# Patient Record
Sex: FEMALE | Race: ASIAN | URBAN - METROPOLITAN AREA
[De-identification: names, ages, dates, MRNs, and addresses within clinical notes are randomized per-mention and may not be internally consistent; named-entity substitution may affect disease eponyms.]

---

## 2022-11-15 ENCOUNTER — EMERGENCY (EMERGENCY)
Facility: HOSPITAL | Age: 22
LOS: 0 days | Discharge: ANOTHER HOSPITAL | End: 2022-11-16
Attending: EMERGENCY MEDICINE

## 2022-11-15 VITALS
HEART RATE: 107 BPM | SYSTOLIC BLOOD PRESSURE: 101 MMHG | RESPIRATION RATE: 20 BRPM | OXYGEN SATURATION: 100 % | TEMPERATURE: 98 F | DIASTOLIC BLOOD PRESSURE: 63 MMHG

## 2022-11-15 VITALS
OXYGEN SATURATION: 98 % | SYSTOLIC BLOOD PRESSURE: 95 MMHG | TEMPERATURE: 99 F | HEIGHT: 61.42 IN | HEART RATE: 135 BPM | DIASTOLIC BLOOD PRESSURE: 54 MMHG | RESPIRATION RATE: 25 BRPM | WEIGHT: 154.98 LBS

## 2022-11-15 DIAGNOSIS — Z3A.01 LESS THAN 8 WEEKS GESTATION OF PREGNANCY: ICD-10-CM

## 2022-11-15 DIAGNOSIS — Z20.822 CONTACT WITH AND (SUSPECTED) EXPOSURE TO COVID-19: ICD-10-CM

## 2022-11-15 DIAGNOSIS — R11.10 VOMITING, UNSPECIFIED: ICD-10-CM

## 2022-11-15 DIAGNOSIS — O99.891 OTHER SPECIFIED DISEASES AND CONDITIONS COMPLICATING PREGNANCY: ICD-10-CM

## 2022-11-15 DIAGNOSIS — R10.11 RIGHT UPPER QUADRANT PAIN: ICD-10-CM

## 2022-11-15 DIAGNOSIS — O23.41 UNSPECIFIED INFECTION OF URINARY TRACT IN PREGNANCY, FIRST TRIMESTER: ICD-10-CM

## 2022-11-15 DIAGNOSIS — N13.2 HYDRONEPHROSIS WITH RENAL AND URETERAL CALCULOUS OBSTRUCTION: ICD-10-CM

## 2022-11-15 LAB
ALBUMIN SERPL ELPH-MCNC: 2.8 G/DL — LOW (ref 3.3–5)
ALP SERPL-CCNC: 108 U/L — SIGNIFICANT CHANGE UP (ref 40–120)
ALT FLD-CCNC: 17 U/L — SIGNIFICANT CHANGE UP (ref 12–78)
ANION GAP SERPL CALC-SCNC: 10 MMOL/L — SIGNIFICANT CHANGE UP (ref 5–17)
APPEARANCE UR: ABNORMAL
APTT BLD: 26.9 SEC — LOW (ref 27.5–35.5)
AST SERPL-CCNC: 13 U/L — LOW (ref 15–37)
BACTERIA # UR AUTO: ABNORMAL
BASOPHILS # BLD AUTO: 0.04 K/UL — SIGNIFICANT CHANGE UP (ref 0–0.2)
BASOPHILS NFR BLD AUTO: 0.3 % — SIGNIFICANT CHANGE UP (ref 0–2)
BILIRUB SERPL-MCNC: 0.9 MG/DL — SIGNIFICANT CHANGE UP (ref 0.2–1.2)
BILIRUB UR-MCNC: NEGATIVE — SIGNIFICANT CHANGE UP
BUN SERPL-MCNC: 7 MG/DL — SIGNIFICANT CHANGE UP (ref 7–23)
CALCIUM SERPL-MCNC: 8.6 MG/DL — SIGNIFICANT CHANGE UP (ref 8.5–10.1)
CHLORIDE SERPL-SCNC: 104 MMOL/L — SIGNIFICANT CHANGE UP (ref 96–108)
CO2 SERPL-SCNC: 21 MMOL/L — LOW (ref 22–31)
COLOR SPEC: YELLOW — SIGNIFICANT CHANGE UP
CREAT SERPL-MCNC: 0.68 MG/DL — SIGNIFICANT CHANGE UP (ref 0.5–1.3)
DIFF PNL FLD: ABNORMAL
EGFR: 126 ML/MIN/1.73M2 — SIGNIFICANT CHANGE UP
EOSINOPHIL # BLD AUTO: 0.01 K/UL — SIGNIFICANT CHANGE UP (ref 0–0.5)
EOSINOPHIL NFR BLD AUTO: 0.1 % — SIGNIFICANT CHANGE UP (ref 0–6)
EPI CELLS # UR: ABNORMAL
FLUAV AG NPH QL: SIGNIFICANT CHANGE UP
FLUBV AG NPH QL: DETECTED
GLUCOSE SERPL-MCNC: 71 MG/DL — SIGNIFICANT CHANGE UP (ref 70–99)
GLUCOSE UR QL: NEGATIVE MG/DL — SIGNIFICANT CHANGE UP
HCG UR QL: POSITIVE
HCT VFR BLD CALC: 32.9 % — LOW (ref 34.5–45)
HGB BLD-MCNC: 11.1 G/DL — LOW (ref 11.5–15.5)
IMM GRANULOCYTES NFR BLD AUTO: 0.7 % — SIGNIFICANT CHANGE UP (ref 0–0.9)
INR BLD: 1.1 RATIO — SIGNIFICANT CHANGE UP (ref 0.88–1.16)
KETONES UR-MCNC: ABNORMAL
LACTATE SERPL-SCNC: 0.9 MMOL/L — SIGNIFICANT CHANGE UP (ref 0.7–2)
LEUKOCYTE ESTERASE UR-ACNC: ABNORMAL
LYMPHOCYTES # BLD AUTO: 1.06 K/UL — SIGNIFICANT CHANGE UP (ref 1–3.3)
LYMPHOCYTES # BLD AUTO: 7.7 % — LOW (ref 13–44)
MCHC RBC-ENTMCNC: 29.1 PG — SIGNIFICANT CHANGE UP (ref 27–34)
MCHC RBC-ENTMCNC: 33.7 G/DL — SIGNIFICANT CHANGE UP (ref 32–36)
MCV RBC AUTO: 86.1 FL — SIGNIFICANT CHANGE UP (ref 80–100)
MONOCYTES # BLD AUTO: 1.07 K/UL — HIGH (ref 0–0.9)
MONOCYTES NFR BLD AUTO: 7.8 % — SIGNIFICANT CHANGE UP (ref 2–14)
NEUTROPHILS # BLD AUTO: 11.47 K/UL — HIGH (ref 1.8–7.4)
NEUTROPHILS NFR BLD AUTO: 83.4 % — HIGH (ref 43–77)
NITRITE UR-MCNC: POSITIVE
NRBC # BLD: 0 /100 WBCS — SIGNIFICANT CHANGE UP (ref 0–0)
PH UR: 7 — SIGNIFICANT CHANGE UP (ref 5–8)
PLATELET # BLD AUTO: 263 K/UL — SIGNIFICANT CHANGE UP (ref 150–400)
POTASSIUM SERPL-MCNC: 3.4 MMOL/L — LOW (ref 3.5–5.3)
POTASSIUM SERPL-SCNC: 3.4 MMOL/L — LOW (ref 3.5–5.3)
PROT SERPL-MCNC: 7.2 GM/DL — SIGNIFICANT CHANGE UP (ref 6–8.3)
PROT UR-MCNC: 30 MG/DL
PROTHROM AB SERPL-ACNC: 13.2 SEC — SIGNIFICANT CHANGE UP (ref 10.5–13.4)
RBC # BLD: 3.82 M/UL — SIGNIFICANT CHANGE UP (ref 3.8–5.2)
RBC # FLD: 13.6 % — SIGNIFICANT CHANGE UP (ref 10.3–14.5)
RBC CASTS # UR COMP ASSIST: SIGNIFICANT CHANGE UP /HPF (ref 0–4)
SARS-COV-2 RNA SPEC QL NAA+PROBE: SIGNIFICANT CHANGE UP
SODIUM SERPL-SCNC: 135 MMOL/L — SIGNIFICANT CHANGE UP (ref 135–145)
SP GR SPEC: 1.01 — SIGNIFICANT CHANGE UP (ref 1.01–1.02)
UROBILINOGEN FLD QL: 4 MG/DL
WBC # BLD: 13.75 K/UL — HIGH (ref 3.8–10.5)
WBC # FLD AUTO: 13.75 K/UL — HIGH (ref 3.8–10.5)
WBC UR QL: ABNORMAL

## 2022-11-15 PROCEDURE — 93010 ELECTROCARDIOGRAM REPORT: CPT

## 2022-11-15 PROCEDURE — 99285 EMERGENCY DEPT VISIT HI MDM: CPT

## 2022-11-15 PROCEDURE — 76775 US EXAM ABDO BACK WALL LIM: CPT | Mod: 26

## 2022-11-15 RX ORDER — ACETAMINOPHEN 500 MG
1000 TABLET ORAL ONCE
Refills: 0 | Status: COMPLETED | OUTPATIENT
Start: 2022-11-15 | End: 2022-11-15

## 2022-11-15 RX ORDER — CEFTRIAXONE 500 MG/1
1000 INJECTION, POWDER, FOR SOLUTION INTRAMUSCULAR; INTRAVENOUS ONCE
Refills: 0 | Status: COMPLETED | OUTPATIENT
Start: 2022-11-15 | End: 2022-11-15

## 2022-11-15 RX ORDER — SODIUM CHLORIDE 9 MG/ML
2200 INJECTION INTRAMUSCULAR; INTRAVENOUS; SUBCUTANEOUS ONCE
Refills: 0 | Status: COMPLETED | OUTPATIENT
Start: 2022-11-15 | End: 2022-11-15

## 2022-11-15 RX ORDER — POTASSIUM CHLORIDE 20 MEQ
40 PACKET (EA) ORAL ONCE
Refills: 0 | Status: COMPLETED | OUTPATIENT
Start: 2022-11-15 | End: 2022-11-15

## 2022-11-15 RX ADMIN — Medication 400 MILLIGRAM(S): at 19:06

## 2022-11-15 RX ADMIN — Medication 40 MILLIEQUIVALENT(S): at 23:15

## 2022-11-15 RX ADMIN — SODIUM CHLORIDE 2200 MILLILITER(S): 9 INJECTION INTRAMUSCULAR; INTRAVENOUS; SUBCUTANEOUS at 18:20

## 2022-11-15 RX ADMIN — CEFTRIAXONE 100 MILLIGRAM(S): 500 INJECTION, POWDER, FOR SOLUTION INTRAMUSCULAR; INTRAVENOUS at 19:06

## 2022-11-15 NOTE — ED ADULT NURSE NOTE - ED STAT RN HANDOFF DETAILS
Dick Mirza(Attending) Report received from CANDI Kirby at 7pm. Assessment available on KB. Pt A&Ox3, resting in bed, appears in NAD, on cardiac monitor, urine collected, fluids infusing. will continue to monitor.

## 2022-11-15 NOTE — ED ADULT TRIAGE NOTE - CHIEF COMPLAINT QUOTE
Patient c/o pain to right flank, fever, headache and vomiting since yesterday. Patient is currently 5 months pregnant.   no pmh

## 2022-11-15 NOTE — ED PROVIDER NOTE - OBJECTIVE STATEMENT
21 y/o female with no pertinent PMHx of c/o R flank pain. Pt describes pain as sharp, 8/10, and non-radiating which began yesterday. Denies, falls, trauma, fever, dysuria, hematuria, or vaginal bleeding. Pt didn't not take any pain medications. Pt endorses one episode of vomiting, but is not currently nauseas. No Hx of kidney stones.

## 2022-11-15 NOTE — ED ADULT TRIAGE NOTE - GLASGOW COMA SCALE: SCORE, MLM
Ongoing SW/CM Assessment/Plan of Care Note     See SW/CM flowsheets for goals and other objective data.    Patient/Family discharge goal (s):  Goal #1: Communication facilitated  Goal #2: Extended Care Facility discharge arranged  Goal #3: Transportation arranged or issues addressed    PT Recommendation:       OT Recommendation:       SLP Recommendation:       Disposition:       Progress note:   Mila at [facility], Floor RN, HUC and pt updated of insurance approval.  Await [employer] authorization # to make transfer arrangements.  Sw following.         15

## 2022-11-15 NOTE — ED PROVIDER NOTE - CLINICAL SUMMARY MEDICAL DECISION MAKING FREE TEXT BOX
Pt  w/ right flank pain  Tachycardic, code sepsis alert , likely source is UTI pylo.   Will do ultrasound to r/o hydronephrosis, possible renal stone  Antibiotics ordered, IV Fluids given, will reevaluate

## 2022-11-15 NOTE — ED PROVIDER NOTE - SHIFT CHANGE DETAILS
F/U US results if hydronephrosis renal stone needs to be rule out patient will need MRI as she is pregnant if negative Re-eval and discharge with antibiotics for pyelo if patient stable and able to tolerate PO

## 2022-11-15 NOTE — ED ADULT NURSE NOTE - OBJECTIVE STATEMENT
Patient received with complaints of pain to R flank that started accompanied with burning sensation with urination. Pt denies nausea but vomited x1 earlier today. Pt is 5 months pregnant. Sepsis protocol initiated, large bore IV started, blood drawn.

## 2022-11-15 NOTE — ED PROVIDER NOTE - GENITOURINARY NEGATIVE STATEMENT, MLM
Additional Notes: Lesions are asymptomatic and benign appearing.  Pt wants to discuss cosmetic removal but decided against it today.
Render Risk Assessment In Note?: no
Detail Level: Simple
no dysuria, no frequency, and no hematuria.

## 2022-11-15 NOTE — ED PROVIDER NOTE - CARE PLAN
1 Principal Discharge DX:	Right renal stone  Secondary Diagnosis:	Acute UTI  Secondary Diagnosis:	Pregnancy

## 2022-11-15 NOTE — ED ADULT NURSE NOTE - ED STAT RN HANDOFF DETAILS 2
Report given to receiving ob RN Holly, Pt A&Ox3, resting in bed, appears in NAD, pts history, current condition and reason for admission discussed, safety concerns addressed and reviewed, pt currently in stable condition, IV flushes for patency and site shows no signs or symptoms of infiltrate, dressing is clean dry and intact, pt is aware of plan of care. Pt education deemed successful at time of report after patient demonstrates successful teach back for proficiency. Pending EMS .

## 2022-11-15 NOTE — ED PROVIDER NOTE - PROGRESS NOTE DETAILS
RENAN MERA: , 5 months gestation, (Due Date 3/27/2023) presenting with right flank pain x 1 days. UTI (+) on UA, WBC (+), Fever, RIGHT sided hydronephrosis on US, influenza B (+), febrile, tachycardic. Concerning for RIGHT sided hydronephrosis concerning for septic stone. LMP 22. Tx Ceftriaxone. PMD Jaz Javier. Spoke w/ OB. TRSF [facility]. To be transferred for fetal monitoring. Dr. Sharpe

## 2022-11-16 ENCOUNTER — APPOINTMENT (OUTPATIENT)
Dept: ANTEPARTUM | Facility: HOSPITAL | Age: 22
End: 2022-11-16

## 2022-11-16 ENCOUNTER — ASOB RESULT (OUTPATIENT)
Age: 22
End: 2022-11-16

## 2022-11-16 ENCOUNTER — INPATIENT (INPATIENT)
Facility: HOSPITAL | Age: 22
LOS: 1 days | Discharge: HOME | End: 2022-11-18
Attending: SPECIALIST | Admitting: SPECIALIST

## 2022-11-16 VITALS
TEMPERATURE: 98 F | DIASTOLIC BLOOD PRESSURE: 58 MMHG | HEART RATE: 106 BPM | SYSTOLIC BLOOD PRESSURE: 109 MMHG | RESPIRATION RATE: 18 BRPM

## 2022-11-16 DIAGNOSIS — N12 TUBULO-INTERSTITIAL NEPHRITIS, NOT SPECIFIED AS ACUTE OR CHRONIC: ICD-10-CM

## 2022-11-16 DIAGNOSIS — R50.9 FEVER, UNSPECIFIED: ICD-10-CM

## 2022-11-16 DIAGNOSIS — Z87.730 PERSONAL HISTORY OF (CORRECTED) CLEFT LIP AND PALATE: Chronic | ICD-10-CM

## 2022-11-16 LAB
BLD GP AB SCN SERPL QL: NEGATIVE — SIGNIFICANT CHANGE UP
HCT VFR BLD CALC: 26.8 % — LOW (ref 34.5–45)
HGB BLD-MCNC: 9 G/DL — LOW (ref 11.5–15.5)
MCHC RBC-ENTMCNC: 29.2 PG — SIGNIFICANT CHANGE UP (ref 27–34)
MCHC RBC-ENTMCNC: 33.6 GM/DL — SIGNIFICANT CHANGE UP (ref 32–36)
MCV RBC AUTO: 87 FL — SIGNIFICANT CHANGE UP (ref 80–100)
NRBC # BLD: 0 /100 WBCS — SIGNIFICANT CHANGE UP (ref 0–0)
NRBC # FLD: 0 K/UL — SIGNIFICANT CHANGE UP (ref 0–0)
PLATELET # BLD AUTO: 226 K/UL — SIGNIFICANT CHANGE UP (ref 150–400)
RBC # BLD: 3.08 M/UL — LOW (ref 3.8–5.2)
RBC # FLD: 13.5 % — SIGNIFICANT CHANGE UP (ref 10.3–14.5)
RH IG SCN BLD-IMP: POSITIVE — SIGNIFICANT CHANGE UP
RH IG SCN BLD-IMP: POSITIVE — SIGNIFICANT CHANGE UP
WBC # BLD: 8.56 K/UL — SIGNIFICANT CHANGE UP (ref 3.8–10.5)
WBC # FLD AUTO: 8.56 K/UL — SIGNIFICANT CHANGE UP (ref 3.8–10.5)

## 2022-11-16 PROCEDURE — 99221 1ST HOSP IP/OBS SF/LOW 40: CPT

## 2022-11-16 PROCEDURE — 76811 OB US DETAILED SNGL FETUS: CPT | Mod: 26

## 2022-11-16 RX ORDER — CEFTRIAXONE 500 MG/1
1000 INJECTION, POWDER, FOR SOLUTION INTRAMUSCULAR; INTRAVENOUS EVERY 24 HOURS
Refills: 0 | Status: DISCONTINUED | OUTPATIENT
Start: 2022-11-16 | End: 2022-11-18

## 2022-11-16 RX ORDER — HEPARIN SODIUM 5000 [USP'U]/ML
5000 INJECTION INTRAVENOUS; SUBCUTANEOUS EVERY 12 HOURS
Refills: 0 | Status: DISCONTINUED | OUTPATIENT
Start: 2022-11-16 | End: 2022-11-16

## 2022-11-16 RX ORDER — CEFTRIAXONE 500 MG/1
1000 INJECTION, POWDER, FOR SOLUTION INTRAMUSCULAR; INTRAVENOUS EVERY 24 HOURS
Refills: 0 | Status: DISCONTINUED | OUTPATIENT
Start: 2022-11-16 | End: 2022-11-16

## 2022-11-16 RX ORDER — HEPARIN SODIUM 5000 [USP'U]/ML
5000 INJECTION INTRAVENOUS; SUBCUTANEOUS EVERY 12 HOURS
Refills: 0 | Status: DISCONTINUED | OUTPATIENT
Start: 2022-11-16 | End: 2022-11-18

## 2022-11-16 RX ORDER — SODIUM CHLORIDE 9 MG/ML
1000 INJECTION INTRAMUSCULAR; INTRAVENOUS; SUBCUTANEOUS ONCE
Refills: 0 | Status: COMPLETED | OUTPATIENT
Start: 2022-11-16 | End: 2022-11-16

## 2022-11-16 RX ORDER — SIMETHICONE 80 MG/1
80 TABLET, CHEWABLE ORAL
Refills: 0 | Status: DISCONTINUED | OUTPATIENT
Start: 2022-11-16 | End: 2022-11-18

## 2022-11-16 RX ORDER — ACETAMINOPHEN 500 MG
975 TABLET ORAL ONCE
Refills: 0 | Status: COMPLETED | OUTPATIENT
Start: 2022-11-16 | End: 2022-11-16

## 2022-11-16 RX ORDER — SIMETHICONE 80 MG/1
80 TABLET, CHEWABLE ORAL
Refills: 0 | Status: DISCONTINUED | OUTPATIENT
Start: 2022-11-16 | End: 2022-11-16

## 2022-11-16 RX ADMIN — Medication 975 MILLIGRAM(S): at 20:07

## 2022-11-16 RX ADMIN — CEFTRIAXONE 100 MILLIGRAM(S): 500 INJECTION, POWDER, FOR SOLUTION INTRAMUSCULAR; INTRAVENOUS at 18:43

## 2022-11-16 RX ADMIN — HEPARIN SODIUM 5000 UNIT(S): 5000 INJECTION INTRAVENOUS; SUBCUTANEOUS at 18:42

## 2022-11-16 RX ADMIN — SODIUM CHLORIDE 1000 MILLILITER(S): 9 INJECTION INTRAMUSCULAR; INTRAVENOUS; SUBCUTANEOUS at 01:16

## 2022-11-16 RX ADMIN — Medication 1 TABLET(S): at 08:28

## 2022-11-16 NOTE — OB PROVIDER H&P - HISTORY OF PRESENT ILLNESS
22y  at 21w0d transferred from [facility]. Pt p/w c/o sharp right sided flank pain, non radiating since the . Denies any nausea, had one episode of vomiting. Denies any urinary symptoms.  Patient was evaluated and found to have influenza B, mild hydronephrosis of right kidney and UA with +nitrite, mod LE, mod blood and mod ketones. Pt received IV hydration, 1g of ceftriaxone.  Reports +FM, no vaginal bleeding, no ROM or LOF  Prenatal care: OB affiliated with [facility], denies any complication with the pregnancy so far.    GYN: Denies   OBH:   PAST MEDICAL & SURGICAL HISTORY: Denies  No Known Allergies

## 2022-11-16 NOTE — OB RN TRIAGE NOTE - CHIEF COMPLAINT QUOTE
Transfer from [place] for Right flank pain , nausea, vomiting, [id number], IV Tylenol at 7 pm and temp 98.3 after the tylenols,  Ceftriaxone 1gm at 7 pm , tachycardia , UTI, hydronephrosis and  influenza B positive

## 2022-11-16 NOTE — OB PROVIDER H&P - NSHPPHYSICALEXAM_GEN_ALL_CORE
T(C): 36.8 (11-15-22 @ 23:16), Max: 38.4 (11-15-22 @ 17:45)  HR: 106 (11-16-22 @ 03:49) (106 - 135)  BP: 109/58 (11-16-22 @ 03:49) (95/54 - 109/58)  RR: 20 (11-15-22 @ 23:16) (20 - 32)  SpO2: 100% (11-15-22 @ 23:16) (98% - 100%)    Heart: RRR  Lungs: CTA  Abdomen: Gravid, soft, NT, +Right sided CVA tenderness    [place]: no contractions  TAS: SLIUP, transverse presentation, posterior placenta,           NARDA: MVP: 4.82, +FM, +FH @ 145bpm    Sono done @ [facility]  Rt kidney: Mild hydronephrosis, no renal mass or calculi  Lt kidney: No hydronephrosis, renal mass or calculi  - SLIUP, breech presentation, FHR @139bpm.  -Closed cervix with cervical length of 4.3cm

## 2022-11-16 NOTE — PROGRESS NOTE ADULT - SUBJECTIVE AND OBJECTIVE BOX
R3 Antepartum Note, HD#1    Patient seen and examined at bedside, no acute overnight events. No acute complaints. Patient states that her right back pain has improved to a 3/10 in severity now. Declines pain medication at this time. Pt reports +FM, denies LOF, VB, ctx, HA, epigastric pain, blurred vision, CP, SOB, N/V, fevers, and chills.    Vital Signs Last 24 Hours  T(C): 36.8 (11-16-22 @ 03:47), Max: 38.4 (11-15-22 @ 17:45)  HR: 106 (11-16-22 @ 03:49) (106 - 135)  BP: 109/58 (11-16-22 @ 03:49) (95/54 - 109/58)  RR: 18 (11-16-22 @ 03:35) (18 - 32)  SpO2: 100% (11-15-22 @ 23:16) (98% - 100%)    CAPILLARY BLOOD GLUCOSE      Physical Exam:  General: NAD  Abdomen: Soft, non-tender, gravid  +Right CVA tenderness  Ext: No pain or swelling    FH present    Labs:             11.1   13.75 )-----------( 263      ( 11-15 @ 18:20 )             32.9     11-15 @ 18:20    135  |  104  |  7   ----------------------------<  71  3.4   |  21  |  0.68    Ca    8.6      11-15 @ 18:20    TPro  7.2  /  Alb  2.8  /  TBili  0.9  /  DBili  x   /  AST  13  /  ALT  17  /  AlkPhos  108  11-15 @ 18:20    PT/INR - ( 11-15 @ 18:20 )   PT: 13.2 sec;   INR: 1.10 ratio    PTT - ( 11-15 @ 18:20 )  PTT:26.9 sec    MEDICATIONS  (STANDING):  cefTRIAXone   IVPB 1000 milliGRAM(s) IV Intermittent every 24 hours       Impulsivity/History of being victimized/traumatized/Community stressors that increase the risk of destabilization/Irritability

## 2022-11-16 NOTE — OB RN TRIAGE NOTE - FALL HARM RISK - UNIVERSAL INTERVENTIONS
Bed in lowest position, wheels locked, appropriate side rails in place/Call bell, personal items and telephone in reach/Instruct patient to call for assistance before getting out of bed or chair/Non-slip footwear when patient is out of bed/[place] to call system/Physically safe environment - no spills, clutter or unnecessary equipment/Purposeful Proactive Rounding/Room/bathroom lighting operational, light cord in reach

## 2022-11-16 NOTE — PROGRESS NOTE ADULT - ASSESSMENT
23yo  at 21w transferred from [place] for right flank pain and treatment of pyelonephritis Patient noted to be + for Influenza B as well. Patient febrile and tachycardic on arrival to [place], PE with R CVA tenderness, UA with Mod ketones, Pos nitrite, mod leuk esterase, many bacteria and Renal US with mild right hydronephrosis. Patient received Ceftriaxone and is stable at this time with improvement in her pain.     #Pyelonephritis  - UA with Mod ketones, Pos nitrite, mod leuk esterase, many bacteria   - Renal US with mild right hydronephrosis  - Ceftriaxone (11/15-)  - pain control prn   - f/u UCX (11/15)    #Fetal wellbeing  - FH present on arrival     #Maternal wellbeing  - Regular diet  - HSQ/SCDs for DVT ppx  - PNV/Iron/Colace/Folic acid  - f/u UCx    Anisa Nunes PGY3

## 2022-11-16 NOTE — OB PROVIDER H&P - ASSESSMENT
22y  at 21w0d admitted for inpatient treatment of pyelonephritis.  Afebrile at present  - +Influenza B  - +UTI  - Mild hydronephrosis of Rt kidney  - Awaiting Urine culture and blood culture results  D/w Dr Nunes and Dr Pedraza  -Admit to Antepartum service  -Pain Management prn  -FH check BID  -IV saline lock  -Regular diet  -Ceftriaxone 1g q 24hrs

## 2022-11-16 NOTE — OB PROVIDER H&P - NSHPLABSRESULTS_GEN_ALL_CORE
LABS:                        11.1   13.75 )-----------( 263      ( 15 Nov 2022 18:20 )             32.9     -15    135  |  104  |  7   ----------------------------<  71  3.4<L>   |  21<L>  |  0.68    Ca    8.6      15 Nov 2022 18:20    TPro  7.2  /  Alb  2.8<L>  /  TBili  0.9  /  DBili  x   /  AST  13<L>  /  ALT  17  /  AlkPhos  108  11-15    I&O's Detail    PT/INR - ( 15 Nov 2022 18:20 )   PT: 13.2 sec;   INR: 1.10 ratio         PTT - ( 15 Nov 2022 18:20 )  PTT:26.9 sec  Urinalysis Basic - ( 15 Nov 2022 19:00 )    Color: Yellow / Appearance: very cloudy / S.010 / pH: x  Gluc: x / Ketone: Moderate  / Bili: Negative / Urobili: 4 mg/dL   Blood: x / Protein: 30 mg/dL / Nitrite: Positive   Leuk Esterase: Moderate / RBC: 0-2 /HPF / WBC 11-25   Sq Epi: x / Non Sq Epi: Moderate / Bacteria: Many

## 2022-11-16 NOTE — OB PROVIDER H&P - PROBLEM SELECTOR PLAN 1
-Admit to Antepartum service  -Pain Management prn  -FH check BID  -IV saline lock  -Regular diet  -Ceftriaxone 1g q 24hrs

## 2022-11-16 NOTE — OB RN TRIAGE NOTE - NSICDXPASTSURGICALHX_GEN_ALL_CORE_FT
PAST SURGICAL HISTORY:  No significant past surgical history PAST SURGICAL HISTORY:  H/O cleft lip repair 2020

## 2022-11-17 ENCOUNTER — TRANSCRIPTION ENCOUNTER (OUTPATIENT)
Age: 22
End: 2022-11-17

## 2022-11-17 LAB
BASOPHILS # BLD AUTO: 0.03 K/UL — SIGNIFICANT CHANGE UP (ref 0–0.2)
BASOPHILS NFR BLD AUTO: 0.4 % — SIGNIFICANT CHANGE UP (ref 0–2)
EOSINOPHIL # BLD AUTO: 0.08 K/UL — SIGNIFICANT CHANGE UP (ref 0–0.5)
EOSINOPHIL NFR BLD AUTO: 1.1 % — SIGNIFICANT CHANGE UP (ref 0–6)
HCT VFR BLD CALC: 29.5 % — LOW (ref 34.5–45)
HGB BLD-MCNC: 10.1 G/DL — LOW (ref 11.5–15.5)
IANC: 4.96 K/UL — SIGNIFICANT CHANGE UP (ref 1.8–7.4)
IMM GRANULOCYTES NFR BLD AUTO: 1.1 % — HIGH (ref 0–0.9)
LYMPHOCYTES # BLD AUTO: 1.1 K/UL — SIGNIFICANT CHANGE UP (ref 1–3.3)
LYMPHOCYTES # BLD AUTO: 15.7 % — SIGNIFICANT CHANGE UP (ref 13–44)
MCHC RBC-ENTMCNC: 29.7 PG — SIGNIFICANT CHANGE UP (ref 27–34)
MCHC RBC-ENTMCNC: 34.2 GM/DL — SIGNIFICANT CHANGE UP (ref 32–36)
MCV RBC AUTO: 86.8 FL — SIGNIFICANT CHANGE UP (ref 80–100)
MONOCYTES # BLD AUTO: 0.74 K/UL — SIGNIFICANT CHANGE UP (ref 0–0.9)
MONOCYTES NFR BLD AUTO: 10.6 % — SIGNIFICANT CHANGE UP (ref 2–14)
NEUTROPHILS # BLD AUTO: 4.96 K/UL — SIGNIFICANT CHANGE UP (ref 1.8–7.4)
NEUTROPHILS NFR BLD AUTO: 71.1 % — SIGNIFICANT CHANGE UP (ref 43–77)
NRBC # BLD: 0 /100 WBCS — SIGNIFICANT CHANGE UP (ref 0–0)
NRBC # FLD: 0 K/UL — SIGNIFICANT CHANGE UP (ref 0–0)
PLATELET # BLD AUTO: 242 K/UL — SIGNIFICANT CHANGE UP (ref 150–400)
RBC # BLD: 3.4 M/UL — LOW (ref 3.8–5.2)
RBC # FLD: 13.8 % — SIGNIFICANT CHANGE UP (ref 10.3–14.5)
WBC # BLD: 6.99 K/UL — SIGNIFICANT CHANGE UP (ref 3.8–10.5)
WBC # FLD AUTO: 6.99 K/UL — SIGNIFICANT CHANGE UP (ref 3.8–10.5)

## 2022-11-17 PROCEDURE — 99232 SBSQ HOSP IP/OBS MODERATE 35: CPT | Mod: GC,25

## 2022-11-17 RX ORDER — ONDANSETRON 8 MG/1
4 TABLET, FILM COATED ORAL ONCE
Refills: 0 | Status: COMPLETED | OUTPATIENT
Start: 2022-11-17 | End: 2022-11-17

## 2022-11-17 RX ADMIN — CEFTRIAXONE 100 MILLIGRAM(S): 500 INJECTION, POWDER, FOR SOLUTION INTRAMUSCULAR; INTRAVENOUS at 20:00

## 2022-11-17 RX ADMIN — HEPARIN SODIUM 5000 UNIT(S): 5000 INJECTION INTRAVENOUS; SUBCUTANEOUS at 06:31

## 2022-11-17 RX ADMIN — Medication 75 MILLIGRAM(S): at 18:15

## 2022-11-17 RX ADMIN — Medication 1 TABLET(S): at 18:15

## 2022-11-17 RX ADMIN — ONDANSETRON 4 MILLIGRAM(S): 8 TABLET, FILM COATED ORAL at 18:30

## 2022-11-17 RX ADMIN — HEPARIN SODIUM 5000 UNIT(S): 5000 INJECTION INTRAVENOUS; SUBCUTANEOUS at 20:00

## 2022-11-17 NOTE — DISCHARGE NOTE ANTEPARTUM - MEDICATION SUMMARY - MEDICATIONS TO TAKE
I will START or STAY ON the medications listed below when I get home from the hospital:    oseltamivir 75 mg oral capsule  -- 1 cap(s) by mouth every 12 hours   -- Check with your doctor before becoming pregnant.  Finish all this medication unless otherwise directed by prescriber.    -- Indication: For Flu    Bactrim  mg-160 mg oral tablet  -- 1 tab(s) by mouth every 12 hours   -- Avoid prolonged or excessive exposure to direct and/or artificial sunlight while taking this medication.  Finish all this medication unless otherwise directed by prescriber.  Medication should be taken with plenty of water.    -- Indication: For Infection    Macrobid 100 mg oral capsule  -- 1 cap(s) by mouth once a day   Please begin after completing Bactrim course  -- Finish all this medication unless otherwise directed by prescriber.  May discolor urine or feces.  Take with food or milk.    -- Indication: For Infection Supression

## 2022-11-17 NOTE — DISCHARGE NOTE ANTEPARTUM - PROVIDER TOKENS
Ears: no ear pain and no hearing problems.Nose: no nasal congestion and no nasal drainage.Mouth/Throat: no dysphagia, no hoarseness and no throat pain.Neck: no lumps, no pain, no stiffness and no swollen glands.
PROVIDER:[TOKEN:[[id number]]],FREE:[LAST:[[facility] Dr Javier],PHONE:[phone]],FAX:[(   )    -],ADDRESS:[[address] [place] [id number]]]

## 2022-11-17 NOTE — CHART NOTE - NSCHARTNOTEFT_GEN_A_CORE
[facility] patient through pregnancy. Discussed with Dr. Javier patients hospital course and that patient currently has been diagnosed with Pyelonephritis with Ecoli growing in urine culture; spoke to her regarding [facility] recommendations for suppression therapy with Keflex or Macrobid for duration of pregnancy. Also informed Dr. Javier that patient diagnosed with Flu B and started on 5 day course of tamiflu. Dr. Javier kindly sent patient prenatal record which was unremarkable and ADITYA confirmed of 3/29/23. Recommended that she follow up with patient for test of cure of UTI in 10-14 days.    Ella Peralta [facility]

## 2022-11-17 NOTE — PROGRESS NOTE ADULT - ASSESSMENT
21yo  at 21w1d transferred from [place] for right flank pain and treatment of pyelonephritis Patient noted to be + for Influenza B as well.  Patient received Ceftriaxone and is stable at this time with improvement in her pain.     #Pyelonephritis  - UA with Mod ketones, Pos nitrite, mod leuk esterase, many bacteria   - Renal US with mild right hydronephrosis  - s/p Ceftriaxone (11/15-)  - pain control prn   - f/u UCX (11/15)    #Fetal wellbeing  - FHR confirmed    - ATU(): variable presentation, posterior placenta, EFW 411g(59%), limited views. MVP 4.65cm.    #Maternal wellbeing  - Regular diet  - HSQ/SCDs for DVT ppx  - PNV/Iron/Colace/Folic acid  - f/u UCx    Mitch Villagran PGY-3 23yo  at 21w1d transferred from [place] for right flank pain and treatment of pyelonephritis Patient noted to be + for Influenza B as well.  Patient received Ceftriaxone and is stable at this time with improvement in her pain.     #Pyelonephritis  - UA with Mod ketones, Pos nitrite, mod leuk esterase, many bacteria   - Renal US with mild right hydronephrosis  - s/p Ceftriaxone (11/15-)  - pain control prn   - UCX (11/15): > 100,000 E. Coli (prelim)    #Fetal wellbeing  - FHR confirmed    - ATU(): variable presentation, posterior placenta, EFW 411g(59%), limited views. MVP 4.65cm.    #Maternal wellbeing  - Regular diet  - HSQ/SCDs for DVT ppx  - PNV/Iron/Colace/Folic acid  - f/u UCx    Mitch Villagran PGY-3 21yo  at 21w1d transferred from [place] for right flank pain and treatment of pyelonephritis Patient noted to be + for Influenza B as well.  Patient received Ceftriaxone and is stable at this time with improvement in her pain.     #Pyelonephritis  - UA with Mod ketones, Pos nitrite, mod leuk esterase, many bacteria   - Renal US with mild right hydronephrosis  - s/p Ceftriaxone (11/15-)  - pain control prn   - UCX (11/15): > 100,000 E. Coli (prelim)  - BCx (11/15): NGTD    #Fetal wellbeing  - FHR confirmed    - ATU(): variable presentation, posterior placenta, EFW 411g(59%), limited views. MVP 4.65cm.    #Maternal wellbeing  - Regular diet  - HSQ/SCDs for DVT ppx  - PNV/Iron/Colace/Folic acid  - f/u UCx    Mitch Villagran PGY-3 23yo  at 21w1d transferred from [place] for right flank pain and treatment of pyelonephritis Patient noted to be + for Influenza B as well.  Patient received Ceftriaxone and is stable at this time with improvement in her pain.     #Pyelonephritis  - UA with Mod ketones, Pos nitrite, mod leuk esterase, many bacteria   - Renal US with mild right hydronephrosis  - s/p Ceftriaxone (11/15-)  - pain control prn   - UCX (11/15): > 100,000 E. Coli (prelim)  - BCx (11/15): NGTD    #Fetal wellbeing  - FHR confirmed    - ATU(): variable presentation, posterior placenta, EFW 411g(59%), limited views. MVP 4.65cm.    #Maternal wellbeing  - Regular diet  - HSQ/SCDs for DVT ppx  - PNV/Iron/Colace/Folic acid  - f/u UCx    Mitch Villagran PGY-3    -------------    MFM Fellow Attestation    Ms. Finch is a 23 yo  at 21w1d admitted with right pyelonephritis. She ruled in with right flank pain, positive UA, urine culture growing E Coli. She has never been febrile this admission but had a low-grade temperature to 100.2F last night. Maternal tachycardia now resolved. She has no leukocytosis and blood culture negative. She is currently treated with ceftriaxone, urine culture sensitivities are pending. Patient has tolerated a diet and pain is overall controlled. Will continue ceftriaxone until able to transition to oral antibiotics based on these sensitivities. Patient will need suppression following 14-day treatment course for remaining duration of pregnancy as well as urine test of cure.     To be seen and d/w Dr. Radha Jean PGY-5  23yo  at 21w1d transferred from [place] for right flank pain and treatment of pyelonephritis Patient noted to be + for Influenza B as well.  Patient received Ceftriaxone and is stable at this time with improvement in her pain.     #Pyelonephritis  - UA with Mod ketones, Pos nitrite, mod leuk esterase, many bacteria   - Renal US with mild right hydronephrosis  - s/p Ceftriaxone (11/15-)  - pain control prn   - UCX (11/15): > 100,000 E. Coli (prelim)  - BCx (11/15): NGTD    #Fetal wellbeing  - FHR confirmed    - ATU(): variable presentation, posterior placenta, EFW 411g(59%), limited views. MVP 4.65cm.    #Maternal wellbeing  - Regular diet  - HSQ/SCDs for DVT ppx  - PNV/Iron/Colace/Folic acid  - f/u UCx    Mitch Villagran PGY-3    -------------    MFM Fellow Attestation    Ms. Finch is a 23 yo  at 21w1d admitted with right pyelonephritis. She ruled in with right flank pain, positive UA, urine culture growing E Coli. She has never been febrile this admission but had a low-grade temperature to 100.2F last night. Maternal tachycardia now resolved. She has no leukocytosis and blood culture negative. She is currently treated with ceftriaxone, urine culture sensitivities are pending. Patient has tolerated a diet and pain is overall controlled. Will continue ceftriaxone until able to transition to oral antibiotics based on these sensitivities. Patient will need suppression following 14-day treatment course for remaining duration of pregnancy as well as urine test of cure. Patient also ruled in for flu B this admission, symptom onset starting this week; given pregnancy comorbidity will start tamiflu.     To be seen and d/w Dr. Radha Jean PGY-5  21yo  at 21w1d transferred from [place] for right flank pain and treatment of pyelonephritis Patient noted to be + for Influenza B as well.  Patient received Ceftriaxone and is stable at this time with improvement in her pain.     #Pyelonephritis  - UA with Mod ketones, Pos nitrite, mod leuk esterase, many bacteria   - Renal US with mild right hydronephrosis  - s/p Ceftriaxone (11/15-)  - pain control prn   - UCX (11/15): > 100,000 E. Coli (prelim)  - BCx (11/15): NGTD    #Fetal wellbeing  - FHR confirmed    - ATU(): variable presentation, posterior placenta, EFW 411g(59%), limited views. MVP 4.65cm.    #Maternal wellbeing  - Regular diet  - HSQ/SCDs for DVT ppx  - PNV/Iron/Colace/Folic acid  - f/u UCx    Mitch Villagran PGY-3    -------------    MFM Fellow Attestation    Ms. Finch is a 21 yo  at 21w1d admitted with right pyelonephritis. She ruled in with right flank pain, positive UA, urine culture growing E Coli. She has never been febrile this admission but had a low-grade temperature to 100.2F last night. Maternal tachycardia now resolved. She has no leukocytosis and blood culture negative. She is currently treated with ceftriaxone, urine culture sensitivities are pending. Patient has tolerated a diet and pain is overall controlled. Will continue ceftriaxone until able to transition to oral antibiotics based on these sensitivities. Patient will need suppression following 14-day treatment course for remaining duration of pregnancy as well as urine test of cure. Patient also ruled in for flu B this admission, symptom onset starting this week; will start tamiflu.     To be seen and d/w Dr. Radha Jean PGY-5

## 2022-11-17 NOTE — DISCHARGE NOTE ANTEPARTUM - PATIENT PORTAL LINK FT
You can access the FollowMyHealth Patient Portal offered by [facility] by registering at the following website: [email or web address]. By joining [employer]’s FollowMyHealth portal, you will also be able to view your health information using other applications (apps) compatible with our system.

## 2022-11-17 NOTE — DISCHARGE NOTE ANTEPARTUM - NS MD DC FALL RISK RISK
For information on Fall & Injury Prevention, visit: [email or web address] OR  [email or web address] OR  https://www.cdc.gov/steadi/patient.html

## 2022-11-17 NOTE — DISCHARGE NOTE ANTEPARTUM - MEDICATION SUMMARY - MEDICATIONS TO STOP TAKING
I will STOP taking the medications listed below when I get home from the hospital:    cefTRIAXone 1 g intravenous injection

## 2022-11-17 NOTE — DISCHARGE NOTE ANTEPARTUM - CARE PLAN
1 Principal Discharge DX:	Pyelonephritis  Assessment and plan of treatment:	Complete antibiotic course (14 day course)  When completed continue Oral antibiotic one pill daily for duration of pregnancy  follow up with you OB within 10-14 days for test of cure (repeat Urine culutre)   Return to hospital with fever, worsening pain, contractions, vaginal bleeding, leaking fluid or decreased fetal movment  Secondary Diagnosis:	Type B influenza  Assessment and plan of treatment:	Continue Tamiflu to complete 5 day course

## 2022-11-17 NOTE — DISCHARGE NOTE ANTEPARTUM - PERSISTENT VOMITING AND DIARRHEA
Received a faxed request for Pathology and imaging records from [facility]( Dr. Tiffani Gil). Faxed Path reports and Routed all other imaging.
Statement Selected

## 2022-11-17 NOTE — DISCHARGE NOTE ANTEPARTUM - CARE PROVIDER_API CALL
Spoke with MD resident Morris on the telephone. MD made aware of sodium decrease to 129. MD stated to keep at the same rate and recheck at 0800   HILTON LYNN  Obstetrics and Gynecology  Phone: [phone]  Follow Up Time:     [facility] [address],   [address] [place] [address]  Phone: [phone]  Fax: (   )    -  Follow Up Time:

## 2022-11-17 NOTE — DISCHARGE NOTE ANTEPARTUM - HOSPITAL COURSE
21 yo  at 21w1d admitted with right pyelonephritis. She ruled in with right flank pain, positive UA, urine culture growing E Coli. She has never been febrile this admission but had a low-grade temperature to 100.2F last night. Maternal tachycardia now resolved. She has no leukocytosis and blood culture negative. Started on ceftriaxone 11/15- . Transitioned to PO _______ based on urine culture sensitivities. [facility] recommends suppression following 14-day treatment course for remaining duration of pregnancy as well as urine test of cure. Patient also ruled in for flu B this admission, symptom onset starting this week; will start tamiflu.  - Renal US with mild right hydronephrosis  - UCX (11/15): > 100,000 E. Coli (prelim)  - BCx (11/15): NGTD  - ATU sonogram(): variable presentation, posterior placenta, EFW 411g(59%), limited views. MVP 4.65cm.      Spoke to Dr Jaz Javier regarding above hospital course and recommendations.

## 2022-11-17 NOTE — PROGRESS NOTE ADULT - SUBJECTIVE AND OBJECTIVE BOX
R3 Antepartum Progress Note    HD#2     Patient seen and examined at bedside, no acute overnight events. No acute complaints, reports resolution of flank pain. Patient endorses good fetal movement, denies any loss of fluid, contractions. Patient is ambulating and tolerating regular diet. Denies CP, SOB, N/V, fevers, chills, or any other concerns.    Vital Signs Last 24 Hours  T(C): 35.8 (11-17-22 @ 06:11), Max: 37.9 (11-16-22 @ 19:41)  HR: 86 (11-17-22 @ 06:11) (81 - 126)  BP: 88/54 (11-17-22 @ 06:11) (86/54 - 99/57)  RR: 18 (11-17-22 @ 06:11) (17 - 18)  SpO2: 99% (11-17-22 @ 06:11) (97% - 100%)    I&O's Summary    16 Nov 2022 07:01  -  17 Nov 2022 07:00  --------------------------------------------------------  IN: 0 mL / OUT: 2200 mL / NET: -2200 mL        Physical Exam:  General: NAD  CV: RR  Lungs: breathing comfortably on RA  Abdomen: soft, gravid, non-tender  Ext: no pain or swelling    Labs:             10.1<L>  6.99  )-----------( 242      ( 11-17 @ 06:14 )             29.5<L>               9.0<L>  8.56  )-----------( 226      ( 11-16 @ 10:00 )             26.8<L>               11.1<L>  13.75<H> )-----------( 263      ( 11-15 @ 18:20 )             32.9<L>        MEDICATIONS  (STANDING):  cefTRIAXone   IVPB 1000 milliGRAM(s) IV Intermittent every 24 hours  heparin   Injectable 5000 Unit(s) SubCutaneous every 12 hours  prenatal multivitamin 1 Tablet(s) Oral daily  simethicone 80 milliGRAM(s) Chew two times a day    MEDICATIONS  (PRN):   R3 Antepartum Progress Note    HD#2     Patient seen and examined at bedside, no acute overnight events. No acute complaints, reports resolution of flank pain. Patient endorses good fetal movement, denies any loss of fluid, contractions. Patient is ambulating and tolerating regular diet. Denies CP, SOB, N/V, fevers, chills, or any other concerns.    Vital Signs Last 24 Hours  T(C): 35.8 (11-17-22 @ 06:11), Max: 37.9 (11-16-22 @ 19:41)  HR: 86 (11-17-22 @ 06:11) (81 - 126)  BP: 88/54 (11-17-22 @ 06:11) (86/54 - 99/57)  RR: 18 (11-17-22 @ 06:11) (17 - 18)  SpO2: 99% (11-17-22 @ 06:11) (97% - 100%)    I&O's Summary    16 Nov 2022 07:01  -  17 Nov 2022 07:00  --------------------------------------------------------  IN: 0 mL / OUT: 2200 mL / NET: -2200 mL        Physical Exam:  General: NAD  CV: RR  Lungs: breathing comfortably on RA  : No CVA tenderness  Abdomen: soft, gravid, non-tender  Ext: no pain or swelling    Labs:             10.1<L>  6.99  )-----------( 242      ( 11-17 @ 06:14 )             29.5<L>               9.0<L>  8.56  )-----------( 226      ( 11-16 @ 10:00 )             26.8<L>               11.1<L>  13.75<H> )-----------( 263      ( 11-15 @ 18:20 )             32.9<L>        MEDICATIONS  (STANDING):  cefTRIAXone   IVPB 1000 milliGRAM(s) IV Intermittent every 24 hours  heparin   Injectable 5000 Unit(s) SubCutaneous every 12 hours  prenatal multivitamin 1 Tablet(s) Oral daily  simethicone 80 milliGRAM(s) Chew two times a day    MEDICATIONS  (PRN):

## 2022-11-18 VITALS
OXYGEN SATURATION: 100 % | SYSTOLIC BLOOD PRESSURE: 97 MMHG | TEMPERATURE: 98 F | HEART RATE: 94 BPM | RESPIRATION RATE: 16 BRPM | DIASTOLIC BLOOD PRESSURE: 55 MMHG

## 2022-11-18 LAB
-  AMIKACIN: SIGNIFICANT CHANGE UP
-  AMOXICILLIN/CLAVULANIC ACID: SIGNIFICANT CHANGE UP
-  AMPICILLIN/SULBACTAM: SIGNIFICANT CHANGE UP
-  AMPICILLIN: SIGNIFICANT CHANGE UP
-  AZTREONAM: SIGNIFICANT CHANGE UP
-  CEFAZOLIN: SIGNIFICANT CHANGE UP
-  CEFEPIME: SIGNIFICANT CHANGE UP
-  CEFOXITIN: SIGNIFICANT CHANGE UP
-  CEFTRIAXONE: SIGNIFICANT CHANGE UP
-  CIPROFLOXACIN: SIGNIFICANT CHANGE UP
-  ERTAPENEM: SIGNIFICANT CHANGE UP
-  GENTAMICIN: SIGNIFICANT CHANGE UP
-  IMIPENEM: SIGNIFICANT CHANGE UP
-  LEVOFLOXACIN: SIGNIFICANT CHANGE UP
-  MEROPENEM: SIGNIFICANT CHANGE UP
-  NITROFURANTOIN: SIGNIFICANT CHANGE UP
-  PIPERACILLIN/TAZOBACTAM: SIGNIFICANT CHANGE UP
-  TOBRAMYCIN: SIGNIFICANT CHANGE UP
-  TRIMETHOPRIM/SULFAMETHOXAZOLE: SIGNIFICANT CHANGE UP
CULTURE RESULTS: SIGNIFICANT CHANGE UP
METHOD TYPE: SIGNIFICANT CHANGE UP
ORGANISM # SPEC MICROSCOPIC CNT: SIGNIFICANT CHANGE UP
ORGANISM # SPEC MICROSCOPIC CNT: SIGNIFICANT CHANGE UP
SPECIMEN SOURCE: SIGNIFICANT CHANGE UP

## 2022-11-18 PROCEDURE — 99238 HOSP IP/OBS DSCHRG MGMT 30/<: CPT | Mod: GC,25

## 2022-11-18 RX ORDER — NITROFURANTOIN MACROCRYSTAL 50 MG
1 CAPSULE ORAL
Qty: 30 | Refills: 0
Start: 2022-11-18 | End: 2022-12-17

## 2022-11-18 RX ORDER — CEFTRIAXONE 500 MG/1
0 INJECTION, POWDER, FOR SOLUTION INTRAMUSCULAR; INTRAVENOUS
Qty: 0 | Refills: 0 | DISCHARGE

## 2022-11-18 RX ADMIN — HEPARIN SODIUM 5000 UNIT(S): 5000 INJECTION INTRAVENOUS; SUBCUTANEOUS at 10:20

## 2022-11-18 RX ADMIN — Medication 75 MILLIGRAM(S): at 10:43

## 2022-11-18 RX ADMIN — Medication 1 TABLET(S): at 15:11

## 2022-11-18 NOTE — PROGRESS NOTE ADULT - SUBJECTIVE AND OBJECTIVE BOX
R3 Antepartum Progress Note   HD#3     Patient seen and examined at bedside, no acute overnight events. No acute complaints. Patient endorses good fetal movement, denies any loss of fluid, contractions. Patient is ambulating and tolerating regular diet. Denies CP, SOB, N/V, fevers, chills, or any other concerns.     Vital Signs Last 24 Hours  T(C): 36.7 (11-18-22 @ 06:26), Max: 36.9 (11-17-22 @ 20:05)  HR: 80 (11-18-22 @ 06:26) (78 - 108)  BP: 87/52 (11-18-22 @ 06:26) (83/48 - 94/54)  RR: 17 (11-18-22 @ 06:26) (17 - 18)  SpO2: 98% (11-18-22 @ 06:26) (98% - 100%)    I&O's Summary    17 Nov 2022 07:01  -  18 Nov 2022 07:00  --------------------------------------------------------  IN: 0 mL / OUT: 1600 mL / NET: -1600 mL        Physical Exam:  General: NAD  CV: RR  : no flank tenderness   Lungs: breathing comfortably on RA  Abdomen: soft, gravid, non-tender  Ext: no pain or swelling    Labs:             10.1<L>  6.99  )-----------( 242      ( 11-17 @ 06:14 )             29.5<L>               9.0<L>  8.56  )-----------( 226      ( 11-16 @ 10:00 )             26.8<L>               11.1<L>  13.75<H> )-----------( 263      ( 11-15 @ 18:20 )             32.9<L>        MEDICATIONS  (STANDING):  cefTRIAXone   IVPB 1000 milliGRAM(s) IV Intermittent every 24 hours  heparin   Injectable 5000 Unit(s) SubCutaneous every 12 hours  oseltamivir 75 milliGRAM(s) Oral every 12 hours  prenatal multivitamin 1 Tablet(s) Oral daily  simethicone 80 milliGRAM(s) Chew two times a day    MEDICATIONS  (PRN):

## 2022-11-18 NOTE — PROGRESS NOTE ADULT - ASSESSMENT
21yo  at 21w2d transferred from [place] for right flank pain and treatment of pyelonephritis Patient noted to be + for Influenza B as well.  Patient received Ceftriaxone and is stable at this time with improvement in her pain.     #Pyelonephritis  - UA with Mod ketones, Pos nitrite, mod leuk esterase, many bacteria   - Renal US with mild right hydronephrosis  - s/p Ceftriaxone (11/15-)  - pain control prn   - UCX (11/15): > 100,000 E. Coli (prelim), f/u final sensitivities   - BCx (11/15): NGTD    #Influenza   - c/w Tamiflu   - Afebrile, VSS     #Fetal wellbeing  - FHR confirmed    - ATU(): variable presentation, posterior placenta, EFW 411g(59%), limited views. MVP 4.65cm.    #Maternal wellbeing  - Regular diet  - HSQ/SCDs for DVT ppx  - PNV/Iron/Colace/Folic acid  - f/u UCx final    Mitch Villagran PGY-3 21yo  at 21w2d transferred from [place] for right flank pain and treatment of pyelonephritis Patient noted to be + for Influenza B as well.  Patient received Ceftriaxone and is stable at this time with improvement in her pain.     #Pyelonephritis  - UA with Mod ketones, Pos nitrite, mod leuk esterase, many bacteria   - Renal US with mild right hydronephrosis  - s/p Ceftriaxone (11/15-)  - pain control prn   - UCX (11/15): > 100,000 E. Coli (prelim), f/u final sensitivities   - BCx (11/15): NGTD    #Influenza   - c/w Tamiflu   - Afebrile, VSS     #Fetal wellbeing  - FHR confirmed    - ATU(): variable presentation, posterior placenta, EFW 411g(59%), limited views. MVP 4.65cm.    #Maternal wellbeing  - Regular diet  - HSQ/SCDs for DVT ppx  - PNV/Iron/Colace/Folic acid  - f/u UCx final    Mitch Villagran PGY-3    -----------    MFM Fellow Attestation    Ms. Finch is a 23 yo  at 21w2d admitted with right pyelonephritis. She ruled in with right flank pain, positive UA, urine culture growing pan-sensitive E Coli. Temperature 100.2F two nights ago, afebrile since then. She has no leukocytosis and blood culture negative. She is day 3 of ceftriaxone Patient has tolerated a diet and pain is overall controlled. Will transition to oral antibiotics (Bactrim) today with discharge to home. Patient will need suppression following 14-day treatment course for remaining duration of pregnancy as well as urine test of cure. Patient also ruled in for flu B this admission, symptom onset starting this week; will start Tamiflu.     Seen and d/w Dr. Sam Jean PGY-5  21yo  at 21w2d transferred from [place] for right flank pain and treatment of pyelonephritis Patient noted to be + for Influenza B as well.  Patient received Ceftriaxone and is stable at this time with improvement in her pain.     #Pyelonephritis  - UA with Mod ketones, Pos nitrite, mod leuk esterase, many bacteria   - Renal US with mild right hydronephrosis  - s/p Ceftriaxone (11/15-)  - pain control prn   - UCX (11/15): > 100,000 E. Coli (prelim), f/u final sensitivities   - BCx (11/15): NGTD    #Influenza   - c/w Tamiflu   - Afebrile, VSS     #Fetal wellbeing  - FHR confirmed    - ATU(): variable presentation, posterior placenta, EFW 411g(59%), limited views. MVP 4.65cm.    #Maternal wellbeing  - Regular diet  - HSQ/SCDs for DVT ppx  - PNV/Iron/Colace/Folic acid  - f/u UCx final    Mitch Villagran PGY-3    -----------    MFM Fellow Attestation    Ms. Finch is a 23 yo  at 21w2d admitted with right pyelonephritis. She ruled in with right flank pain, positive UA, urine culture growing E Coli (resistant to ampicillin otherwise pan-sensitive). Temperature 100.2F two nights ago, afebrile since then. She has resolved leukocytosis and blood culture resulted negative. She has been receiving ceftriaxone since admission. Patient has tolerated a diet and pain is overall controlled. Will transition to oral antibiotics (Bactrim) today with discharge to home. Patient will need suppression following 14-day treatment course for remaining duration of pregnancy as well as urine test of cure. Patient also ruled in for flu B this admission, symptom onset starting this week; patient's VS have been stable with no respiratory complaints, Tamiflu initiated yesterday to continue for 5-day course.     Seen and d/w Dr. Sam Jean PGY-5  21yo  at 21w2d transferred from [place] for right flank pain and treatment of pyelonephritis Patient noted to be + for Influenza B as well.  Patient received Ceftriaxone and is stable at this time with improvement in her pain.     #Pyelonephritis  - UA with Mod ketones, Pos nitrite, mod leuk esterase, many bacteria   - Renal US with mild right hydronephrosis  - s/p Ceftriaxone (11/15-)  - pain control prn   - UCX (11/15): > 100,000 E. Coli (prelim), f/u final sensitivities   - BCx (11/15): NGTD    #Influenza   - c/w Tamiflu   - Afebrile, VSS     #Fetal wellbeing  - FHR confirmed    - ATU(): variable presentation, posterior placenta, EFW 411g(59%), limited views. MVP 4.65cm.    #Maternal wellbeing  - Regular diet  - HSQ/SCDs for DVT ppx  - PNV/Iron/Colace/Folic acid  - f/u UCx final    Mitch Villagran PGY-3    -----------    MFM Fellow Attestation    Ms. Finch is a 23 yo  at 21w2d admitted with right pyelonephritis. She ruled in with right flank pain, positive UA, urine culture growing E Coli (resistant to ampicillin otherwise pan-sensitive). Temperature 100.2F two nights ago, afebrile since then. She has resolved leukocytosis and blood culture resulted negative. She has been receiving ceftriaxone since admission. Patient has tolerated a diet and pain is overall controlled. Will transition to oral antibiotics (Bactrim) today with discharge to home. Patient will need suppression following 14-day treatment course for remaining duration of pregnancy as well as urine test of cure. Patient also ruled in for flu B this admission, symptom onset starting this week; patient's VS have been stable with no respiratory complaints, Tamiflu initiated yesterday to continue for 5-day course. She has follow up with her primary OB in 10 days.      Seen and d/w Dr. Sam Jean PGY-5

## 2022-11-18 NOTE — PROGRESS NOTE ADULT - ATTENDING COMMENTS
PAtient seen and examined.  Has no significant flank pain now and is afebrile. On  ceftriaxone  Will continue another day of IV antibiotics and switch to oral antibiotics for total of 12-14 days.  Consider suppression therapy after treatement
MFM ATTENDING ATTESTATION    asymptomatic today  denies flank pain, sob    afebrile  comfortable  no cva tenderness    abd soft gravid nontender    wbc downtrending  influenza B positive  UCx e. coli nearly pan-sensitive    clinically improved  dc home today  complete 2w course of po abx for pyelo followed by macrobid suppression  complete tamiflu course    follow up with PMD 1-2 weeks.
pt seen and assessed by me  No complaints  no CVA tenderness  plan to d/c on appropriate abx once sensitivities return.

## 2022-11-21 PROBLEM — Z00.00 ENCOUNTER FOR PREVENTIVE HEALTH EXAMINATION: Status: ACTIVE | Noted: 2022-11-21

## 2022-11-21 LAB
CULTURE RESULTS: SIGNIFICANT CHANGE UP
CULTURE RESULTS: SIGNIFICANT CHANGE UP
SPECIMEN SOURCE: SIGNIFICANT CHANGE UP
SPECIMEN SOURCE: SIGNIFICANT CHANGE UP

## 2023-11-21 NOTE — OB RN TRIAGE NOTE - SUICIDE SCREENING QUESTION 3
Show Applicator Variable?: Yes Detail Level: Detailed Render Post-Care Instructions In Note?: no Post-Care Instructions: I reviewed with the patient in detail post-care instructions. Patient is to wear sunprotection, and avoid picking at any of the treated lesions. Pt may apply Vaseline to crusted or scabbing areas. Consent: The patient's consent was obtained including but not limited to risks of crusting, scabbing, blistering, scarring, darker or lighter pigmentary change, recurrence, incomplete removal and infection. Duration Of Freeze Thaw-Cycle (Seconds): 3 No

## 2024-07-03 NOTE — ED ADULT NURSE NOTE - NAME OF THE PERSON WHO RECEIVED REPORT AT THE FACILITY THE PATIENT IS TRANSFERRING TO
Consent: The patient's consent was obtained including but not limited to risks of crusting, scabbing, blistering, scarring, darker or lighter pigmentary change, recurrence, incomplete removal and infection. Detail Level: Detailed Show Applicator Variable?: Yes Render Note In Bullet Format When Appropriate: No Duration Of Freeze Thaw-Cycle (Seconds): 15 Post-Care Instructions: I reviewed with the patient in detail post-care instructions. Patient is to wear sunprotection, and avoid picking at any of the treated lesions. Pt may apply Vaseline to crusted or scabbing areas. CANDI Barrera